# Patient Record
Sex: MALE | Race: WHITE | NOT HISPANIC OR LATINO
[De-identification: names, ages, dates, MRNs, and addresses within clinical notes are randomized per-mention and may not be internally consistent; named-entity substitution may affect disease eponyms.]

---

## 2017-02-21 ENCOUNTER — RX RENEWAL (OUTPATIENT)
Age: 82
End: 2017-02-21

## 2017-03-01 ENCOUNTER — APPOINTMENT (OUTPATIENT)
Dept: ENDOCRINOLOGY | Facility: CLINIC | Age: 82
End: 2017-03-01

## 2017-03-01 ENCOUNTER — LABORATORY RESULT (OUTPATIENT)
Age: 82
End: 2017-03-01

## 2017-03-01 VITALS
HEART RATE: 63 BPM | BODY MASS INDEX: 26.52 KG/M2 | WEIGHT: 175 LBS | HEIGHT: 68.25 IN | DIASTOLIC BLOOD PRESSURE: 70 MMHG | SYSTOLIC BLOOD PRESSURE: 120 MMHG | OXYGEN SATURATION: 97 %

## 2017-03-02 LAB
ALBUMIN SERPL ELPH-MCNC: 4.4 G/DL
ALP BLD-CCNC: 56 U/L
ALT SERPL-CCNC: 11 U/L
ANION GAP SERPL CALC-SCNC: 13 MMOL/L
AST SERPL-CCNC: 18 U/L
BASOPHILS # BLD AUTO: 0.02 K/UL
BASOPHILS NFR BLD AUTO: 0.4 %
BILIRUB SERPL-MCNC: 0.5 MG/DL
BUN SERPL-MCNC: 20 MG/DL
CALCIUM SERPL-MCNC: 9.5 MG/DL
CHLORIDE SERPL-SCNC: 100 MMOL/L
CO2 SERPL-SCNC: 26 MMOL/L
CREAT SERPL-MCNC: 1.3 MG/DL
EOSINOPHIL # BLD AUTO: 0.34 K/UL
EOSINOPHIL NFR BLD AUTO: 6.4 %
FERRITIN SERPL-MCNC: 27.9 NG/ML
FT4I SERPL CALC-MCNC: 9.2 INDEX
GLUCOSE SERPL-MCNC: 90 MG/DL
HCT VFR BLD CALC: 44.7 %
HGB BLD-MCNC: 14.8 G/DL
IMM GRANULOCYTES NFR BLD AUTO: 0 %
IRON SATN MFR SERPL: 32 %
IRON SERPL-MCNC: 113 UG/DL
LYMPHOCYTES # BLD AUTO: 1.59 K/UL
LYMPHOCYTES NFR BLD AUTO: 29.8 %
MAN DIFF?: NORMAL
MCHC RBC-ENTMCNC: 30.6 PG
MCHC RBC-ENTMCNC: 33.1 GM/DL
MCV RBC AUTO: 92.4 FL
MONOCYTES # BLD AUTO: 0.49 K/UL
MONOCYTES NFR BLD AUTO: 9.2 %
NEUTROPHILS # BLD AUTO: 2.9 K/UL
NEUTROPHILS NFR BLD AUTO: 54.2 %
PLATELET # BLD AUTO: 173 K/UL
POTASSIUM SERPL-SCNC: 5.2 MMOL/L
PROT SERPL-MCNC: 7.3 G/DL
RBC # BLD: 4.84 M/UL
RBC # FLD: 13.8 %
SODIUM SERPL-SCNC: 139 MMOL/L
T3 SERPL-MCNC: 99 NG/DL
T3RU NFR SERPL: 0.94 INDEX
T4 FREE SERPL-MCNC: 1.4 NG/DL
T4 SERPL-MCNC: 8.7 UG/DL
THYROGLOB AB SERPL-ACNC: <20 IU/ML
THYROGLOB SERPL-MCNC: <0.2 NG/ML
TIBC SERPL-MCNC: 352 UG/DL
TSH SERPL-ACNC: 1.65 UIU/ML
UIBC SERPL-MCNC: 239 UG/DL
WBC # FLD AUTO: 5.34 K/UL

## 2018-02-16 ENCOUNTER — RX RENEWAL (OUTPATIENT)
Age: 83
End: 2018-02-16

## 2018-03-08 ENCOUNTER — LABORATORY RESULT (OUTPATIENT)
Age: 83
End: 2018-03-08

## 2018-03-08 ENCOUNTER — APPOINTMENT (OUTPATIENT)
Dept: ENDOCRINOLOGY | Facility: CLINIC | Age: 83
End: 2018-03-08
Payer: MEDICARE

## 2018-03-08 VITALS
WEIGHT: 174 LBS | HEIGHT: 68.25 IN | SYSTOLIC BLOOD PRESSURE: 120 MMHG | OXYGEN SATURATION: 98 % | HEART RATE: 76 BPM | DIASTOLIC BLOOD PRESSURE: 70 MMHG | BODY MASS INDEX: 26.37 KG/M2

## 2018-03-08 PROCEDURE — 99214 OFFICE O/P EST MOD 30 MIN: CPT

## 2018-03-08 RX ORDER — BACITRACIN 500 [USP'U]/G
500 OINTMENT OPHTHALMIC
Qty: 4 | Refills: 0 | Status: COMPLETED | COMMUNITY
Start: 2016-11-07

## 2018-03-09 LAB
ALBUMIN SERPL ELPH-MCNC: 4.3 G/DL
ALP BLD-CCNC: 56 U/L
ALT SERPL-CCNC: 14 U/L
ANION GAP SERPL CALC-SCNC: 11 MMOL/L
AST SERPL-CCNC: 21 U/L
BILIRUB SERPL-MCNC: 0.5 MG/DL
BUN SERPL-MCNC: 20 MG/DL
CALCIUM SERPL-MCNC: 9.8 MG/DL
CHLORIDE SERPL-SCNC: 103 MMOL/L
CO2 SERPL-SCNC: 29 MMOL/L
CREAT SERPL-MCNC: 1.26 MG/DL
ESTRADIOL SERPL-MCNC: 20 PG/ML
FSH SERPL-MCNC: 10.5 IU/L
GLUCOSE SERPL-MCNC: 66 MG/DL
LH SERPL-ACNC: 8.5 IU/L
POTASSIUM SERPL-SCNC: 5.1 MMOL/L
PROLACTIN SERPL-MCNC: 17 NG/ML
PROT SERPL-MCNC: 7.2 G/DL
SODIUM SERPL-SCNC: 143 MMOL/L
T3RU NFR SERPL: 0.91 INDEX
T4 SERPL-MCNC: 9.3 UG/DL
TESTOST SERPL-MCNC: 516.1 NG/DL
THYROGLOB AB SERPL-ACNC: <20 IU/ML
THYROGLOB SERPL-MCNC: <0.2 NG/ML
TSH SERPL-ACNC: 1.51 UIU/ML

## 2018-03-12 LAB — SHBG SERPL-SCNC: 53 NMOL/L

## 2019-01-28 ENCOUNTER — TRANSCRIPTION ENCOUNTER (OUTPATIENT)
Age: 84
End: 2019-01-28

## 2019-01-29 ENCOUNTER — OUTPATIENT (OUTPATIENT)
Dept: OUTPATIENT SERVICES | Facility: HOSPITAL | Age: 84
LOS: 1 days | End: 2019-01-29
Payer: MEDICARE

## 2019-01-29 VITALS
HEART RATE: 56 BPM | WEIGHT: 173.72 LBS | TEMPERATURE: 97 F | OXYGEN SATURATION: 99 % | DIASTOLIC BLOOD PRESSURE: 83 MMHG | HEIGHT: 68.5 IN | RESPIRATION RATE: 18 BRPM | SYSTOLIC BLOOD PRESSURE: 153 MMHG

## 2019-01-29 VITALS
SYSTOLIC BLOOD PRESSURE: 134 MMHG | RESPIRATION RATE: 18 BRPM | HEART RATE: 65 BPM | DIASTOLIC BLOOD PRESSURE: 77 MMHG | OXYGEN SATURATION: 100 %

## 2019-01-29 DIAGNOSIS — H25.811 COMBINED FORMS OF AGE-RELATED CATARACT, RIGHT EYE: ICD-10-CM

## 2019-01-29 DIAGNOSIS — Z98.890 OTHER SPECIFIED POSTPROCEDURAL STATES: Chronic | ICD-10-CM

## 2019-01-29 DIAGNOSIS — Z90.89 ACQUIRED ABSENCE OF OTHER ORGANS: Chronic | ICD-10-CM

## 2019-01-29 PROCEDURE — V2632: CPT

## 2019-01-29 PROCEDURE — 66984 XCAPSL CTRC RMVL W/O ECP: CPT | Mod: RT

## 2019-02-04 PROBLEM — N40.0 BENIGN PROSTATIC HYPERPLASIA WITHOUT LOWER URINARY TRACT SYMPTOMS: Chronic | Status: ACTIVE | Noted: 2019-01-29

## 2019-02-04 PROBLEM — C73 MALIGNANT NEOPLASM OF THYROID GLAND: Chronic | Status: ACTIVE | Noted: 2019-01-29

## 2019-02-06 ENCOUNTER — RX RENEWAL (OUTPATIENT)
Age: 84
End: 2019-02-06

## 2019-03-05 ENCOUNTER — APPOINTMENT (OUTPATIENT)
Dept: ENDOCRINOLOGY | Facility: CLINIC | Age: 84
End: 2019-03-05
Payer: MEDICARE

## 2019-03-05 ENCOUNTER — LABORATORY RESULT (OUTPATIENT)
Age: 84
End: 2019-03-05

## 2019-03-05 ENCOUNTER — TRANSCRIPTION ENCOUNTER (OUTPATIENT)
Age: 84
End: 2019-03-05

## 2019-03-05 VITALS
DIASTOLIC BLOOD PRESSURE: 90 MMHG | BODY MASS INDEX: 25.91 KG/M2 | SYSTOLIC BLOOD PRESSURE: 124 MMHG | HEART RATE: 54 BPM | WEIGHT: 171 LBS | OXYGEN SATURATION: 97 % | HEIGHT: 68 IN

## 2019-03-05 DIAGNOSIS — N62 HYPERTROPHY OF BREAST: ICD-10-CM

## 2019-03-05 PROCEDURE — 77080 DXA BONE DENSITY AXIAL: CPT | Mod: GA

## 2019-03-05 PROCEDURE — ZZZZZ: CPT

## 2019-03-05 PROCEDURE — 99214 OFFICE O/P EST MOD 30 MIN: CPT | Mod: 25

## 2019-03-06 LAB
ALBUMIN SERPL ELPH-MCNC: 4.4 G/DL
ALP BLD-CCNC: 61 U/L
ALT SERPL-CCNC: 14 U/L
ANION GAP SERPL CALC-SCNC: 13 MMOL/L
AST SERPL-CCNC: 21 U/L
BILIRUB SERPL-MCNC: 0.4 MG/DL
BUN SERPL-MCNC: 20 MG/DL
CALCIUM SERPL-MCNC: 10 MG/DL
CHLORIDE SERPL-SCNC: 101 MMOL/L
CO2 SERPL-SCNC: 26 MMOL/L
CREAT SERPL-MCNC: 1.25 MG/DL
GLUCOSE SERPL-MCNC: 90 MG/DL
POTASSIUM SERPL-SCNC: 4.5 MMOL/L
PROT SERPL-MCNC: 7.6 G/DL
SODIUM SERPL-SCNC: 140 MMOL/L
T3RU NFR SERPL: 0.9 TBI
T4 SERPL-MCNC: 8.9 UG/DL
THYROGLOB AB SERPL-ACNC: <20 IU/ML
THYROGLOB SERPL-MCNC: <0.2 NG/ML
TSH SERPL-ACNC: 1.48 UIU/ML

## 2019-03-06 NOTE — END OF VISIT
[FreeTextEntry3] : I, Rhona Borja, authored this note working as a medical scribe for Dr. Valle.  03/05/2019. 11:45AM. \par This note was authored by Rhona Borja working as medical scribe for me. I have reviewed, edited, and revised the note as needed. I am in agreement with the exam findings, imaging findings, and treatment plan.  Jose Valle MD

## 2019-03-06 NOTE — PROCEDURE
[FreeTextEntry1] : Bone mineral density: 03/05/2019 \par Indication: vs. 2008\par Spine: (L1-L2) -1.2 osteopenia, no significant change \par Total hip: -0.7 normal (-8.4%)\par Femoral neck: -1.3 osteopenia (-9.0%)\par Proximal radius: -2.2 osteopenia, no prior

## 2019-03-06 NOTE — ASSESSMENT
[FreeTextEntry1] : 81 yo male with \par \par 1. H/o papillary thyroid CA: 2005, WANDA, clinically and chemically euthyroid on LT4 100. Pt states he gets colder during the afternoon. No local neck pain or dysphagia. Maintain low-normal TSH.\par \par 2. Mild gynecomastia: although no true glandular tissue. Decreased sexual function. Pt advised that this is minor and that testo replacement has risks and benefits. No rx. \par \par 3. Osteopenia: BMD 3/2019 vs 2008 sl decrease in hip though still consistent with osteopenia and not high risk for future fx based on this value. This is not a clear indication for men as it was determined from a population of postmenopausal women. His rate of change is not rapid; no indication for additional osteoporosis rx. \par \par I request labs sent out to include TG. f/u in 1 year.

## 2019-03-06 NOTE — REVIEW OF SYSTEMS
[Joint Pain] : joint pain [Joint Stiffness] : joint stiffness [Back Pain] : back pain [Negative] : Heme/Lymph [Erectile Dysfunction] : erectile dysfunction [Fatigue] : no fatigue [Dysphagia] : no dysphagia [Dysphonia] : no dysphonia [Neck Pain] : no neck pain [Chest Pain] : no chest pain [Palpitations] : no palpitations [Leg Claudication] : no intermittent leg claudication [Lower Ext Edema] : no lower extremity edema [Shortness Of Breath] : no shortness of breath [Wheezing] : no wheezing was heard [Cough] : no cough [SOB on Exertion] : no shortness of breath during exertion [Orthopnea] : no orthopnea [Nausea] : no nausea [Vomiting] : no vomiting was observed [Constipation] : no constipation [Diarrhea] : no diarrhea [Polyuria] : no polyuria [Dysuria] : no dysuria [Muscle Weakness] : no muscle weakness [Muscle Cramps] : no muscle cramps [Myalgia] : no myalgia  [Acanthosis] : no acanthosis  [Hirsutism] : no hirsutism [Acne] : no acne [Hair Loss] : no hair loss [Dry Skin] : no dry skin [Headache] : no headaches [Tremors] : no tremors [Dizziness] : no dizziness [Pain/Numbness of Digits] : no pain/numbness of digits [Depression] : no depression [Anxiety] : no anxiety [Insomnia] : no insomnia [Stress] : no stress [Easy Bleeding] : no ~M tendency for easy bleeding [Swelling] : no swelling [Lymphadenopathy] : no lymphadenopathy

## 2019-03-06 NOTE — HISTORY OF PRESENT ILLNESS
[FreeTextEntry1] : one year f/u 84 y/o male with h/o papillary thyroid CA in 2005; multicentric follicular variant.\par \par On LT4 100 qd. No sx of  hyper on hypothyroidism. No local neck pain. No dysphagia. Mild GERD. Wt has been stable. Chemically euthyroid. \par \par Mild gynecomastia, no mastodynia. Slow chronic decrease in erectile function.\par \par C/o R-hip pain at the femoral head. Worse upon awakening and eases with mobility. c/o back discomfort and standing is worse and relieved with sitting. No radiation of pain. Does core strength exercising (planks and push-ups) and has improved his pain. Drinks skim milk every day. No fhx osteoporosis. Decrease in height due to disc disease. Sl hunching in morning, lower back pain. \par \par Pt had cataract surgery in 2019 with improved vision but starburst issue still present.

## 2019-03-06 NOTE — PHYSICAL EXAM
[Alert] : alert [No Acute Distress] : no acute distress [Well Nourished] : well nourished [Well Developed] : well developed [Normal Sclera/Conjunctiva] : normal sclera/conjunctiva [No Proptosis] : no proptosis [Normal Oropharynx] : the oropharynx was normal [No Neck Mass] : no neck mass was observed [Supple] : the neck was supple [Well Healed Scar] : well healed scar [No Respiratory Distress] : no respiratory distress [No Accessory Muscle Use] : no accessory muscle use [Clear to Auscultation] : lungs were clear to auscultation bilaterally [Normal Rate] : heart rate was normal  [Normal S1, S2] : normal S1 and S2 [Regular Rhythm] : with a regular rhythm [Normal Bowel Sounds] : normal bowel sounds [Not Tender] : non-tender [Soft] : abdomen soft [Not Distended] : not distended [Post Cervical Nodes] : posterior cervical nodes [Anterior Cervical Nodes] : anterior cervical nodes [Axillary Nodes] : axillary nodes [Normal] : normal and non tender [No Spinal Tenderness] : no spinal tenderness [No Stigmata of Cushings Syndrome] : no stigmata of cushings syndrome [Normal Gait] : normal gait [Normal Strength/Tone] : muscle strength and tone were normal [No Rash] : no rash [Normal Reflexes] : deep tendon reflexes were 2+ and symmetric [No Tremors] : no tremors [Oriented x3] : oriented to person, place, and time [Gynecomastia] : no gynecomastia [de-identified] : minimal scoliosis

## 2019-10-28 ENCOUNTER — RX RENEWAL (OUTPATIENT)
Age: 84
End: 2019-10-28

## 2020-05-28 ENCOUNTER — TRANSCRIPTION ENCOUNTER (OUTPATIENT)
Age: 85
End: 2020-05-28

## 2020-06-12 ENCOUNTER — APPOINTMENT (OUTPATIENT)
Dept: ENDOCRINOLOGY | Facility: CLINIC | Age: 85
End: 2020-06-12
Payer: MEDICARE

## 2020-06-12 VITALS
OXYGEN SATURATION: 96 % | SYSTOLIC BLOOD PRESSURE: 124 MMHG | HEART RATE: 66 BPM | BODY MASS INDEX: 25.76 KG/M2 | WEIGHT: 170 LBS | HEIGHT: 68 IN | DIASTOLIC BLOOD PRESSURE: 78 MMHG

## 2020-06-12 PROCEDURE — 99214 OFFICE O/P EST MOD 30 MIN: CPT

## 2020-06-12 RX ORDER — VIT A/VIT C/VIT E/ZINC/COPPER 4296-226
CAPSULE ORAL
Refills: 0 | Status: ACTIVE | COMMUNITY

## 2020-06-12 NOTE — HISTORY OF PRESENT ILLNESS
[FreeTextEntry1] :   f/u 10  h/o papillary thyroid CA in 2005; multicentric follicular variant.\par \par On LT4 100 qd. No sx of  hyper on hypothyroidism. No local neck pain. No dysphagia. Mild GERD. Wt has been stable. Chemically euthyroid. \par Occ low back in am. responds to stretching occ NSAID\par \par Pt had cataract surgery in 2019 with improved vision but starburst issue still present.

## 2020-06-12 NOTE — ASSESSMENT
[FreeTextEntry1] : 85 yo male with \par \par 1. H/o papillary thyroid CA: 2005, WANDA, clinically and chemically euthyroid on LT4 100 for hypothyroidism . Pt states he gets colder during the afternoon. No local neck pain or dysphagia. Maintain low-normal TSH.\par \par 2. . Osteopenia: BMD 3/2019 vs 2008 sl decrease in hip though still consistent with osteopenia and not high risk for future fx based on this value. This is not a clear indication for men as it was determined from a population of postmenopausal women. His rate of change is not rapid; no indication for additional osteoporosis rx. \par \par I request labs from Dr Alaniz  to include TG. f/u in 1 year.

## 2020-06-12 NOTE — PHYSICAL EXAM
[Alert] : alert [Well Nourished] : well nourished [Well Developed] : well developed [No Acute Distress] : no acute distress [Normal Sclera/Conjunctiva] : normal sclera/conjunctiva [No Proptosis] : no proptosis [EOMI] : extra ocular movement intact [No Thyroid Nodules] : no palpable thyroid nodules [Thyroid Not Enlarged] : the thyroid was not enlarged [No Respiratory Distress] : no respiratory distress [No Accessory Muscle Use] : no accessory muscle use [Well Healed Scar] : well healed scar [Clear to Auscultation] : lungs were clear to auscultation bilaterally [Normal S1, S2] : normal S1 and S2 [No Edema] : no peripheral edema [Normal Rate] : heart rate was normal [Regular Rhythm] : with a regular rhythm [Not Tender] : non-tender [Normal Bowel Sounds] : normal bowel sounds [Soft] : abdomen soft [Not Distended] : not distended [Normal Anterior Cervical Nodes] : no anterior cervical lymphadenopathy [Normal Posterior Cervical Nodes] : no posterior cervical lymphadenopathy [No Stigmata of Cushings Syndrome] : no stigmata of Cushings Syndrome [Normal Strength/Tone] : muscle strength and tone were normal [No Rash] : no rash [Normal Gait] : normal gait [Acanthosis Nigricans] : no acanthosis nigricans [No Tremors] : no tremors [Normal Reflexes] : deep tendon reflexes were 2+ and symmetric [Oriented x3] : oriented to person, place, and time

## 2020-06-12 NOTE — REASON FOR VISIT
[Follow - Up] : a follow-up visit [Thyroid Biopsy] : a thyroid biopsy [Hypothyroidism] : hypothyroidism [Spouse] : spouse

## 2020-06-17 ENCOUNTER — TRANSCRIPTION ENCOUNTER (OUTPATIENT)
Age: 85
End: 2020-06-17

## 2020-06-18 ENCOUNTER — TRANSCRIPTION ENCOUNTER (OUTPATIENT)
Age: 85
End: 2020-06-18

## 2020-07-20 ENCOUNTER — RX RENEWAL (OUTPATIENT)
Age: 85
End: 2020-07-20

## 2020-11-10 ENCOUNTER — NON-APPOINTMENT (OUTPATIENT)
Age: 85
End: 2020-11-10

## 2020-11-10 ENCOUNTER — APPOINTMENT (OUTPATIENT)
Dept: OPHTHALMOLOGY | Facility: CLINIC | Age: 85
End: 2020-11-10
Payer: MEDICARE

## 2020-11-10 PROCEDURE — 92014 COMPRE OPH EXAM EST PT 1/>: CPT

## 2020-11-10 PROCEDURE — 92202 OPSCPY EXTND ON/MAC DRAW: CPT

## 2020-11-10 PROCEDURE — 92134 CPTRZ OPH DX IMG PST SGM RTA: CPT

## 2020-12-08 ENCOUNTER — NON-APPOINTMENT (OUTPATIENT)
Age: 85
End: 2020-12-08

## 2020-12-08 ENCOUNTER — APPOINTMENT (OUTPATIENT)
Dept: OPHTHALMOLOGY | Facility: CLINIC | Age: 85
End: 2020-12-08
Payer: MEDICARE

## 2020-12-08 PROCEDURE — 99212 OFFICE O/P EST SF 10 MIN: CPT | Mod: 95

## 2021-06-30 ENCOUNTER — APPOINTMENT (OUTPATIENT)
Dept: ENDOCRINOLOGY | Facility: CLINIC | Age: 86
End: 2021-06-30
Payer: MEDICARE

## 2021-06-30 VITALS
DIASTOLIC BLOOD PRESSURE: 76 MMHG | SYSTOLIC BLOOD PRESSURE: 120 MMHG | BODY MASS INDEX: 25.39 KG/M2 | TEMPERATURE: 98 F | WEIGHT: 167 LBS | OXYGEN SATURATION: 96 % | HEART RATE: 57 BPM

## 2021-06-30 PROCEDURE — 99214 OFFICE O/P EST MOD 30 MIN: CPT

## 2021-06-30 NOTE — REASON FOR VISIT
[Follow - Up] : a follow-up visit [Hypothyroidism] : hypothyroidism [Spouse] : spouse [Thyroid Cancer] : thyroid cancer

## 2021-06-30 NOTE — HISTORY OF PRESENT ILLNESS
[FreeTextEntry1] :   f/u   h/o papillary thyroid CA in 2005; multicentric follicular variant.\par \par On LT4 100 qd. No sx of  hyper on hypothyroidism. No local neck pain. No dysphagia. Mild GERD. Wt has been stable. Chemically euthyroid. \par Occ low back in am. responds to stretching occ NSAID\par \par Pt had cataract surgery in 2019 with improved vision but starburst issue still present. \par Had release trigger finger 3rd Right

## 2021-06-30 NOTE — ASSESSMENT
[FreeTextEntry1] : 5   yo male with \par \par 1. H/o papillary thyroid CA: 2005, WANDA, clinically and chemically euthyroid on LT4 100 for hypothyroidism . Pt states he gets colder during the afternoon. No local neck pain or dysphagia. Maintain low-normal TSH.\par \par 2. . Osteopenia: BMD 3/2019 vs 2008 sl decrease in hip though still consistent with osteopenia and not high risk for future fx based on this value. This is not a clear indication for men as it was determined from a population of postmenopausal women. His rate of change is not rapid; no indication for additional osteoporosis rx. \par \par check TG. next labs\par repeat BMD next visit

## 2021-06-30 NOTE — PHYSICAL EXAM
[Alert] : alert [Well Nourished] : well nourished [No Acute Distress] : no acute distress [Well Developed] : well developed [Normal Sclera/Conjunctiva] : normal sclera/conjunctiva [EOMI] : extra ocular movement intact [No Proptosis] : no proptosis [Thyroid Not Enlarged] : the thyroid was not enlarged [No Thyroid Nodules] : no palpable thyroid nodules [Well Healed Scar] : well healed scar [No Respiratory Distress] : no respiratory distress [No Accessory Muscle Use] : no accessory muscle use [Clear to Auscultation] : lungs were clear to auscultation bilaterally [Normal S1, S2] : normal S1 and S2 [Normal Rate] : heart rate was normal [Regular Rhythm] : with a regular rhythm [No Edema] : no peripheral edema [Normal Bowel Sounds] : normal bowel sounds [Not Tender] : non-tender [Not Distended] : not distended [Soft] : abdomen soft [Normal Anterior Cervical Nodes] : no anterior cervical lymphadenopathy [No Stigmata of Cushings Syndrome] : no stigmata of Cushings Syndrome [Normal Gait] : normal gait [Normal Strength/Tone] : muscle strength and tone were normal [No Rash] : no rash [Normal Reflexes] : deep tendon reflexes were 2+ and symmetric [No Tremors] : no tremors [Oriented x3] : oriented to person, place, and time [Acanthosis Nigricans] : no acanthosis nigricans

## 2021-10-19 ENCOUNTER — NON-APPOINTMENT (OUTPATIENT)
Age: 86
End: 2021-10-19

## 2021-11-11 ENCOUNTER — APPOINTMENT (OUTPATIENT)
Dept: OPHTHALMOLOGY | Facility: CLINIC | Age: 86
End: 2021-11-11
Payer: MEDICARE

## 2021-11-11 ENCOUNTER — NON-APPOINTMENT (OUTPATIENT)
Age: 86
End: 2021-11-11

## 2021-11-11 PROCEDURE — 92014 COMPRE OPH EXAM EST PT 1/>: CPT

## 2021-11-11 PROCEDURE — 92202 OPSCPY EXTND ON/MAC DRAW: CPT

## 2021-11-11 PROCEDURE — 92134 CPTRZ OPH DX IMG PST SGM RTA: CPT

## 2021-12-09 ENCOUNTER — APPOINTMENT (OUTPATIENT)
Dept: OPHTHALMOLOGY | Facility: CLINIC | Age: 86
End: 2021-12-09
Payer: MEDICARE

## 2021-12-09 ENCOUNTER — NON-APPOINTMENT (OUTPATIENT)
Age: 86
End: 2021-12-09

## 2021-12-09 PROCEDURE — 66821 AFTER CATARACT LASER SURGERY: CPT | Mod: RT

## 2021-12-09 PROCEDURE — 92136 OPHTHALMIC BIOMETRY: CPT

## 2021-12-30 ENCOUNTER — APPOINTMENT (OUTPATIENT)
Dept: OPHTHALMOLOGY | Facility: CLINIC | Age: 86
End: 2021-12-30
Payer: MEDICARE

## 2021-12-30 ENCOUNTER — NON-APPOINTMENT (OUTPATIENT)
Age: 86
End: 2021-12-30

## 2021-12-30 PROCEDURE — 99024 POSTOP FOLLOW-UP VISIT: CPT

## 2022-01-14 ENCOUNTER — APPOINTMENT (OUTPATIENT)
Dept: ENDOCRINOLOGY | Facility: CLINIC | Age: 87
End: 2022-01-14
Payer: MEDICARE

## 2022-01-14 ENCOUNTER — LABORATORY RESULT (OUTPATIENT)
Age: 87
End: 2022-01-14

## 2022-01-14 VITALS
SYSTOLIC BLOOD PRESSURE: 124 MMHG | BODY MASS INDEX: 26.52 KG/M2 | WEIGHT: 173 LBS | HEIGHT: 67.9 IN | TEMPERATURE: 97 F | HEART RATE: 72 BPM | OXYGEN SATURATION: 98 % | DIASTOLIC BLOOD PRESSURE: 82 MMHG

## 2022-01-14 PROCEDURE — 77080 DXA BONE DENSITY AXIAL: CPT | Mod: GA

## 2022-01-14 PROCEDURE — ZZZZZ: CPT

## 2022-01-14 PROCEDURE — 99214 OFFICE O/P EST MOD 30 MIN: CPT | Mod: 25

## 2022-01-14 RX ORDER — MULTIVITAMIN
TABLET ORAL
Refills: 0 | Status: ACTIVE | COMMUNITY

## 2022-01-14 NOTE — END OF VISIT
[FreeTextEntry3] : I, Mohamud Rucker, authored this note working as a medical scribe for Dr. Valle.  01/14/2022.  1:45PM. This note was authored by the medical scribe for me. I have reviewed, edited, and revised the note as needed. I am in agreement with the exam findings, imaging findings, and treatment plan.  Jose Valle MD

## 2022-01-14 NOTE — ASSESSMENT
[Levothyroxine] : The patient was instructed to take Levothyroxine on an empty stomach, separate from vitamins, and wait at least 30 minutes before eating [FreeTextEntry1] : 87 yo male with \par \par 1. H/o papillary thyroid CA: 2005, WANDA, clinically and chemically euthyroid on LT4 100 mcg daily for hypothyroidism. No local neck pain. No dysphagia or dysphonia. No raciness, shakiness, heat/cold intolerance, tiredness, or fatigue. No palpitations, tremors, or sudden weight gain/loss. Maintain low-normal TSH.\par \par 2. Osteopenia: BMD 3/2019 vs 2008 sl decrease in hip though still consistent with osteopenia and not high risk for future fx based on this value. This is not a clear indication for men as it was determined from a population of postmenopausal women. BMD 1/2022 indicates stable osteopenia, worsened normal total hip, worsened osteopenia in femoral neck, and stable osteoporosis in proximal radius. BMD results reviewed w/ pt. Pt risk of future fx is still low. No indication for additional osteoporosis rx at this time.\par \par Labs reviewed: Vitamin D 26.6, low.\par \par Request labs sent out. Will include repeat Vitamin D, TFT, and TG.\par \par F/u 6 months

## 2022-01-14 NOTE — PHYSICAL EXAM
[Alert] : alert [Well Nourished] : well nourished [No Acute Distress] : no acute distress [Well Developed] : well developed [Normal Sclera/Conjunctiva] : normal sclera/conjunctiva [EOMI] : extra ocular movement intact [No Proptosis] : no proptosis [Thyroid Not Enlarged] : the thyroid was not enlarged [No Thyroid Nodules] : no palpable thyroid nodules [Well Healed Scar] : well healed scar [Clear to Auscultation] : lungs were clear to auscultation bilaterally [Normal S1, S2] : normal S1 and S2 [Normal Rate] : heart rate was normal [Regular Rhythm] : with a regular rhythm [No Edema] : no peripheral edema [Normal Bowel Sounds] : normal bowel sounds [Not Tender] : non-tender [Not Distended] : not distended [Soft] : abdomen soft [Normal Anterior Cervical Nodes] : no anterior cervical lymphadenopathy [No Stigmata of Cushings Syndrome] : no stigmata of Cushings Syndrome [Normal Gait] : normal gait [Normal Reflexes] : deep tendon reflexes were 2+ and symmetric [No Tremors] : no tremors [Oriented x3] : oriented to person, place, and time [de-identified] : thyroidectomy scar

## 2022-01-14 NOTE — REASON FOR VISIT
[Follow - Up] : a follow-up visit [Hypothyroidism] : hypothyroidism [Thyroid Cancer] : thyroid cancer [Spouse] : spouse

## 2022-01-14 NOTE — PROCEDURE
[FreeTextEntry1] : Bone mineral density: 01/14/2022 \par Indication: vs. 2019\par Spine: (L1-L2) -1.3 osteopenia, no significant change\par Total hip: -0.9 normal, -3.3%\par Femoral neck: -1.6 osteopenia, -4.3%\par Proximal radius: -2.5 osteoporosis, no significant change\par \par Bone mineral density: 03/05/2019 \par Indication: vs. 2008\par Spine: (L1-L2) -1.2 osteopenia, no significant change \par Total hip: -0.7 normal (-8.4%)\par Femoral neck: -1.3 osteopenia (-9.0%)\par Proximal radius: -2.2 osteopenia, no prior

## 2022-01-14 NOTE — HISTORY OF PRESENT ILLNESS
[FreeTextEntry1] : No significant interval health changes. No  hospitalizations, fractures, or change in medications.\par \par H/o papillary thyroid CA in 2005; multicentric follicular variant. Chemically euthyroid on LT4 100 mcg daily. No sx of hyper on hypothyroidism. No local neck pain. No dysphagia or dysphonia. No raciness, shakiness, heat/cold intolerance, tiredness, or fatigue. No palpitations, tremors, or sudden weight gain/loss. \par \par Mild GERD. Wt has been stable. Pt takes multivitamin daily.\par \par Pt had cataract surgery in 2019 with improved vision but starburst issue still present. \par \par H/o trigger 3rd right finger release summer 2021.

## 2022-01-17 LAB
25(OH)D3 SERPL-MCNC: 30.5 NG/ML
ALBUMIN SERPL ELPH-MCNC: 4.4 G/DL
ALP BLD-CCNC: 67 U/L
ALT SERPL-CCNC: 10 U/L
ANION GAP SERPL CALC-SCNC: 11 MMOL/L
AST SERPL-CCNC: 19 U/L
BILIRUB SERPL-MCNC: 0.3 MG/DL
BUN SERPL-MCNC: 24 MG/DL
CALCIUM SERPL-MCNC: 9.6 MG/DL
CALCIUM SERPL-MCNC: 9.6 MG/DL
CHLORIDE SERPL-SCNC: 105 MMOL/L
CO2 SERPL-SCNC: 26 MMOL/L
CREAT SERPL-MCNC: 1.21 MG/DL
GLUCOSE SERPL-MCNC: 92 MG/DL
PARATHYROID HORMONE INTACT: 57 PG/ML
POTASSIUM SERPL-SCNC: 4.7 MMOL/L
PROT SERPL-MCNC: 6.8 G/DL
SODIUM SERPL-SCNC: 142 MMOL/L
T3RU NFR SERPL: 1 TBI
T4 SERPL-MCNC: 7.4 UG/DL
THYROGLOB AB SERPL-ACNC: <20 IU/ML
THYROGLOB SERPL-MCNC: <0.2 NG/ML
TSH SERPL-ACNC: 2.83 UIU/ML

## 2022-02-03 ENCOUNTER — APPOINTMENT (OUTPATIENT)
Dept: OPHTHALMOLOGY | Facility: CLINIC | Age: 87
End: 2022-02-03
Payer: MEDICARE

## 2022-02-03 ENCOUNTER — NON-APPOINTMENT (OUTPATIENT)
Age: 87
End: 2022-02-03

## 2022-02-03 PROCEDURE — 92134 CPTRZ OPH DX IMG PST SGM RTA: CPT

## 2022-02-03 PROCEDURE — 92025 CPTRIZED CORNEAL TOPOGRAPHY: CPT

## 2022-02-03 PROCEDURE — 92014 COMPRE OPH EXAM EST PT 1/>: CPT | Mod: 24

## 2022-06-24 ENCOUNTER — NON-APPOINTMENT (OUTPATIENT)
Age: 87
End: 2022-06-24

## 2022-06-28 ENCOUNTER — NON-APPOINTMENT (OUTPATIENT)
Age: 87
End: 2022-06-28

## 2022-06-28 ENCOUNTER — APPOINTMENT (OUTPATIENT)
Dept: CARDIOLOGY | Facility: CLINIC | Age: 87
End: 2022-06-28

## 2022-06-28 VITALS
SYSTOLIC BLOOD PRESSURE: 146 MMHG | HEIGHT: 67 IN | WEIGHT: 172 LBS | OXYGEN SATURATION: 98 % | DIASTOLIC BLOOD PRESSURE: 77 MMHG | BODY MASS INDEX: 27 KG/M2 | HEART RATE: 61 BPM

## 2022-06-28 DIAGNOSIS — R07.9 CHEST PAIN, UNSPECIFIED: ICD-10-CM

## 2022-06-28 PROCEDURE — 99204 OFFICE O/P NEW MOD 45 MIN: CPT

## 2022-06-28 PROCEDURE — 93000 ELECTROCARDIOGRAM COMPLETE: CPT

## 2022-07-03 PROBLEM — R07.9 CHEST PAIN: Status: ACTIVE | Noted: 2022-07-03

## 2022-07-04 ENCOUNTER — RX RENEWAL (OUTPATIENT)
Age: 87
End: 2022-07-04

## 2022-07-05 ENCOUNTER — APPOINTMENT (OUTPATIENT)
Dept: CARDIOLOGY | Facility: CLINIC | Age: 87
End: 2022-07-05

## 2022-07-05 PROCEDURE — 93306 TTE W/DOPPLER COMPLETE: CPT

## 2022-07-05 PROCEDURE — 93015 CV STRESS TEST SUPVJ I&R: CPT

## 2022-07-20 ENCOUNTER — APPOINTMENT (OUTPATIENT)
Dept: ENDOCRINOLOGY | Facility: CLINIC | Age: 87
End: 2022-07-20

## 2022-07-20 VITALS
BODY MASS INDEX: 26.37 KG/M2 | WEIGHT: 168 LBS | SYSTOLIC BLOOD PRESSURE: 136 MMHG | HEART RATE: 62 BPM | HEIGHT: 67 IN | TEMPERATURE: 97.7 F | OXYGEN SATURATION: 98 % | DIASTOLIC BLOOD PRESSURE: 72 MMHG

## 2022-07-20 DIAGNOSIS — M85.80 OTHER SPECIFIED DISORDERS OF BONE DENSITY AND STRUCTURE, UNSPECIFIED SITE: ICD-10-CM

## 2022-07-20 PROCEDURE — 99214 OFFICE O/P EST MOD 30 MIN: CPT

## 2022-07-20 RX ORDER — TADALAFIL 5 MG/1
5 TABLET ORAL
Qty: 90 | Refills: 0 | Status: ACTIVE | COMMUNITY
Start: 2022-07-12

## 2022-07-21 PROBLEM — M85.80 OSTEOPENIA: Status: ACTIVE | Noted: 2019-03-05

## 2022-07-21 NOTE — ASSESSMENT
[Levothyroxine] : The patient was instructed to take Levothyroxine on an empty stomach, separate from vitamins, and wait at least 30 minutes before eating [FreeTextEntry1] : 85 yo male with \par \par 1. Thyroid: H/o papillary thyroid CA: 2005, WANDA, clinically and chemically euthyroid on LT4 100 mcg daily for hypothyroidism. No local neck pain. No dysphagia or dysphonia. No raciness, shakiness, heat/cold intolerance, tiredness, or fatigue. No palpitations, tremors, or sudden weight gain/loss. Maintain low-normal TSH.\par \par 2. Osteopenia: BMD 3/2019 vs 2008 sl decrease in hip though still consistent with osteopenia and not high risk for future fx based on this value. This is not a clear indication for men as it was determined from a population of postmenopausal women. BMD 1/2022 indicates stable osteopenia, worsened normal total hip, worsened osteopenia in femoral neck, and stable osteoporosis in proximal radius. BMD results reviewed w/ pt. Pt risk of future fx is still low. No indication for additional osteoporosis rx at this time.\par \par Pt c/o some hip pain  due to arthritis.\par \par Call For Labs \par \par F/u 6 months w BMD

## 2022-07-21 NOTE — HISTORY OF PRESENT ILLNESS
[FreeTextEntry1] : Patient returns for a follow up visit for hypothyroidism, thyroid cancer and osteoporosis. Since the last visit pt has no significant interval health changes. No  hospitalizations, fractures, or change in medications.\par \par Thyroid ; \par H/o papillary thyroid CA in 2005; multicentric follicular variant. Chemically euthyroid on LT4 100 mcg daily. No sx of hyper on hypothyroidism. No local neck pain. No dysphagia or dysphonia. No raciness, shakiness, heat/cold intolerance, tiredness, or fatigue. No palpitations, tremors, or sudden weight gain/loss. \par \par Osteopenia: Pt has mildly low bone density in the proximal radius. I do not recommend treatment. Observe \par \par \par Mild GERD. Wt has been stable. Pt takes multivitamin daily.\par \par Pt had cataract surgery in 2019 with improved vision but starburst issue still present. \par \par Pt c/o some hip pain due to arthritis. \par

## 2022-07-21 NOTE — PHYSICAL EXAM
[Alert] : alert [Well Nourished] : well nourished [No Acute Distress] : no acute distress [Well Developed] : well developed [Normal Sclera/Conjunctiva] : normal sclera/conjunctiva [EOMI] : extra ocular movement intact [No Proptosis] : no proptosis [Thyroid Not Enlarged] : the thyroid was not enlarged [No Thyroid Nodules] : no palpable thyroid nodules [Well Healed Scar] : well healed scar [Clear to Auscultation] : lungs were clear to auscultation bilaterally [Normal S1, S2] : normal S1 and S2 [Normal Rate] : heart rate was normal [Regular Rhythm] : with a regular rhythm [No Edema] : no peripheral edema [Normal Bowel Sounds] : normal bowel sounds [Not Tender] : non-tender [Not Distended] : not distended [Soft] : abdomen soft [Normal Anterior Cervical Nodes] : no anterior cervical lymphadenopathy [No Stigmata of Cushings Syndrome] : no stigmata of Cushings Syndrome [Normal Gait] : normal gait [Normal Reflexes] : deep tendon reflexes were 2+ and symmetric [No Tremors] : no tremors [Oriented x3] : oriented to person, place, and time [de-identified] : thyroidectomy scar no thyroid nodes

## 2022-07-21 NOTE — END OF VISIT
[FreeTextEntry3] : This note was written by Kay Willett on ( July 20, 2022) acting as a medical scribe for Dr. Valle.  This note was authored by the medical scribe for me. I have reviewed, edited, and revised the note as needed. I am in agreement with the exam findings, imaging findings, and treatment plan.  Jose Valle MD

## 2022-11-22 ENCOUNTER — NON-APPOINTMENT (OUTPATIENT)
Age: 87
End: 2022-11-22

## 2022-11-22 ENCOUNTER — APPOINTMENT (OUTPATIENT)
Dept: OPHTHALMOLOGY | Facility: CLINIC | Age: 87
End: 2022-11-22

## 2022-11-22 PROCEDURE — 92014 COMPRE OPH EXAM EST PT 1/>: CPT

## 2023-01-13 ENCOUNTER — NON-APPOINTMENT (OUTPATIENT)
Age: 88
End: 2023-01-13

## 2023-01-17 ENCOUNTER — APPOINTMENT (OUTPATIENT)
Dept: OPHTHALMOLOGY | Facility: AMBULATORY SURGERY CENTER | Age: 88
End: 2023-01-17
Payer: MEDICARE

## 2023-01-17 PROCEDURE — 66982 XCAPSL CTRC RMVL CPLX WO ECP: CPT | Mod: LT

## 2023-01-18 ENCOUNTER — NON-APPOINTMENT (OUTPATIENT)
Age: 88
End: 2023-01-18

## 2023-01-18 ENCOUNTER — APPOINTMENT (OUTPATIENT)
Dept: OPHTHALMOLOGY | Facility: CLINIC | Age: 88
End: 2023-01-18
Payer: MEDICARE

## 2023-01-18 PROCEDURE — 99024 POSTOP FOLLOW-UP VISIT: CPT

## 2023-01-24 ENCOUNTER — NON-APPOINTMENT (OUTPATIENT)
Age: 88
End: 2023-01-24

## 2023-01-24 ENCOUNTER — APPOINTMENT (OUTPATIENT)
Dept: OPHTHALMOLOGY | Facility: CLINIC | Age: 88
End: 2023-01-24
Payer: MEDICARE

## 2023-01-24 PROCEDURE — 99024 POSTOP FOLLOW-UP VISIT: CPT

## 2023-01-31 ENCOUNTER — APPOINTMENT (OUTPATIENT)
Dept: ENDOCRINOLOGY | Facility: CLINIC | Age: 88
End: 2023-01-31
Payer: MEDICARE

## 2023-01-31 VITALS
WEIGHT: 165 LBS | RESPIRATION RATE: 16 BRPM | DIASTOLIC BLOOD PRESSURE: 82 MMHG | SYSTOLIC BLOOD PRESSURE: 132 MMHG | HEIGHT: 67 IN | BODY MASS INDEX: 25.9 KG/M2 | OXYGEN SATURATION: 99 % | HEART RATE: 56 BPM

## 2023-01-31 DIAGNOSIS — E03.9 HYPOTHYROIDISM, UNSPECIFIED: ICD-10-CM

## 2023-01-31 PROCEDURE — 77080 DXA BONE DENSITY AXIAL: CPT | Mod: GA

## 2023-01-31 PROCEDURE — ZZZZZ: CPT

## 2023-01-31 PROCEDURE — 99214 OFFICE O/P EST MOD 30 MIN: CPT | Mod: 25

## 2023-02-01 NOTE — PROCEDURE
[FreeTextEntry1] : Bone Density 1/31/2023\par Indication vs 2022 Prior test showed bone loss \par Spine L1-L2 -1.1 osteopenia normal , no significant change \par Total Hip -09 normal , no significant change \par Femoral Neck -1.4 osteopenia , no significant change \par Proximal Radius -3.0 osteoporosis -3.9% \par \par Bone mineral density: 01/14/2022 \par Indication: vs. 2019\par Spine: (L1-L2) -1.3 osteopenia, no significant change\par Total hip: -0.9 normal, -3.3%\par Femoral neck: -1.6 osteopenia, -4.3%\par Proximal radius: -2.5 osteoporosis, no significant change\par \par Bone mineral density: 03/05/2019 \par Indication: vs. 2008\par Spine: (L1-L2) -1.2 osteopenia, no significant change \par Total hip: -0.7 normal (-8.4%)\par Femoral neck: -1.3 osteopenia (-9.0%)\par Proximal radius: -2.2 osteopenia, no prior

## 2023-02-01 NOTE — PHYSICAL EXAM
[Alert] : alert [Well Nourished] : well nourished [No Acute Distress] : no acute distress [Well Developed] : well developed [Normal Sclera/Conjunctiva] : normal sclera/conjunctiva [EOMI] : extra ocular movement intact [No Proptosis] : no proptosis [Thyroid Not Enlarged] : the thyroid was not enlarged [No Thyroid Nodules] : no palpable thyroid nodules [Well Healed Scar] : well healed scar [Clear to Auscultation] : lungs were clear to auscultation bilaterally [Normal S1, S2] : normal S1 and S2 [Normal Rate] : heart rate was normal [Regular Rhythm] : with a regular rhythm [No Edema] : no peripheral edema [Normal Bowel Sounds] : normal bowel sounds [Not Tender] : non-tender [Not Distended] : not distended [Soft] : abdomen soft [Normal Anterior Cervical Nodes] : no anterior cervical lymphadenopathy [No Stigmata of Cushings Syndrome] : no stigmata of Cushings Syndrome [Normal Gait] : normal gait [Normal Reflexes] : deep tendon reflexes were 2+ and symmetric [No Tremors] : no tremors [Oriented x3] : oriented to person, place, and time [de-identified] : thyroidectomy scar no thyroid nodes

## 2023-02-01 NOTE — END OF VISIT
[FreeTextEntry3] : This note was written by Kay Willett on ( January 31, 2023 ) acting as a medical scribe for Dr. Valle.  This note was authored by the medical scribe for me. I have reviewed, edited, and revised the note as needed. I am in agreement with the exam findings, imaging findings, and treatment plan.  Jose Valle MD

## 2023-02-01 NOTE — ASSESSMENT
[Levothyroxine] : The patient was instructed to take Levothyroxine on an empty stomach, separate from vitamins, and wait at least 30 minutes before eating [FreeTextEntry1] : 86 yo male with \par \par 1. Thyroid: H/o papillary thyroid CA: 2005, WANDA, clinically and chemically euthyroid on LT4 100 mcg daily for hypothyroidism. No local neck pain. No dysphagia or dysphonia. No raciness, shakiness, heat/cold intolerance, tiredness, or fatigue. No palpitations, tremors, or sudden weight gain/loss. Maintain low-normal TSH.\par \par 2.  Osteoporosis.:  . BMD 1/2023 shows that there has been a decrease in bone density in the proximal radius but hip bone density remains fairly average.. BMD results reviewed w/ pt. Pt risk of future fx is still low. \par Controversy concerning treatment of male osteoporosis based on low forearm only with average hip discussed.  I recommend against medical therapy at this time\par  \par \par \par Call For Labs Dr Alaniz\par \par F/u w BMD in 1 year

## 2023-02-01 NOTE — HISTORY OF PRESENT ILLNESS
[FreeTextEntry1] : Patient returns for a follow up visit for hypothyroidism, thyroid cancer and osteoporosis. Pt reports that he has been dx with GERD. Pt is now taking famotidine. t also reports having cataract surgery. Procedure went well. \par \par Thyroid ; \par H/o papillary thyroid CA in 2005; multicentric follicular variant. Chemically euthyroid on LT4 100 mcg daily. No sx of hyper on hypothyroidism. No local neck pain. No dysphagia or dysphonia. No raciness, shakiness, heat/cold intolerance, tiredness, or fatigue. No palpitations, tremors, or sudden weight gain/loss. \par \par Osteopenia: Pt has mildly low bone density in the proximal radius. I do not recommend treatment. Observe \par \par Mild GERD. Wt has been stable. Pt takes multivitamin daily.\par \par Pt had cataract surgery in 2019 with improved vision but starburst issue still present. \par \par Pt c/o some hip pain due to arthritis. \par

## 2023-02-21 ENCOUNTER — NON-APPOINTMENT (OUTPATIENT)
Age: 88
End: 2023-02-21

## 2023-02-21 ENCOUNTER — APPOINTMENT (OUTPATIENT)
Dept: OPHTHALMOLOGY | Facility: CLINIC | Age: 88
End: 2023-02-21
Payer: MEDICARE

## 2023-02-21 PROCEDURE — 99024 POSTOP FOLLOW-UP VISIT: CPT

## 2023-03-08 ENCOUNTER — NON-APPOINTMENT (OUTPATIENT)
Age: 88
End: 2023-03-08

## 2023-03-12 ENCOUNTER — NON-APPOINTMENT (OUTPATIENT)
Age: 88
End: 2023-03-12

## 2023-03-30 ENCOUNTER — RX RENEWAL (OUTPATIENT)
Age: 88
End: 2023-03-30

## 2023-05-23 ENCOUNTER — APPOINTMENT (OUTPATIENT)
Dept: OPHTHALMOLOGY | Facility: CLINIC | Age: 88
End: 2023-05-23

## 2023-08-22 ENCOUNTER — NON-APPOINTMENT (OUTPATIENT)
Age: 88
End: 2023-08-22

## 2023-08-22 ENCOUNTER — APPOINTMENT (OUTPATIENT)
Dept: OPHTHALMOLOGY | Facility: CLINIC | Age: 88
End: 2023-08-22
Payer: MEDICARE

## 2023-08-22 PROCEDURE — 92014 COMPRE OPH EXAM EST PT 1/>: CPT

## 2023-08-22 PROCEDURE — 92134 CPTRZ OPH DX IMG PST SGM RTA: CPT

## 2023-09-05 ENCOUNTER — APPOINTMENT (OUTPATIENT)
Dept: CARDIOLOGY | Facility: CLINIC | Age: 88
End: 2023-09-05
Payer: MEDICARE

## 2023-09-05 VITALS — DIASTOLIC BLOOD PRESSURE: 88 MMHG | SYSTOLIC BLOOD PRESSURE: 125 MMHG

## 2023-09-05 VITALS
HEIGHT: 67 IN | DIASTOLIC BLOOD PRESSURE: 76 MMHG | HEART RATE: 61 BPM | SYSTOLIC BLOOD PRESSURE: 138 MMHG | BODY MASS INDEX: 27 KG/M2 | WEIGHT: 172 LBS | OXYGEN SATURATION: 99 %

## 2023-09-05 PROCEDURE — 99214 OFFICE O/P EST MOD 30 MIN: CPT

## 2023-09-05 PROCEDURE — 93000 ELECTROCARDIOGRAM COMPLETE: CPT

## 2023-09-05 NOTE — DISCUSSION/SUMMARY
[FreeTextEntry1] : Tim is a 67yoM PMH hypothyroidism, osteopenia and thyroid cancer who presents as a referral from Dr. Alaniz for bradycardia.   EKG shows sinus bradycardia at 60. He is asymptomatic and in excellent shape, going to HIIT classes twice per week.   TTE done last year showed preserved LV and RV function but with mild dilation of aortic root.  Will repeat TTE in 6 months and will follow up in 6 months as well.  [EKG obtained to assist in diagnosis and management of assessed problem(s)] : EKG obtained to assist in diagnosis and management of assessed problem(s)

## 2023-09-05 NOTE — HISTORY OF PRESENT ILLNESS
[FreeTextEntry1] : Tim is a 67yoM PMH hypothyroidism, osteopenia and thyroid cancer who presents as a referral from Dr. Alaniz for bradycardia.   He was last seen by Dr. Brice in June of 2022. He had been having some chest discomfort at that time. He was ordered for TTE and exercise nuclear stress.   Doing HIIT at the gym twice per week and feels very well. Denies chest pain, SOB, palpitations.   He has no known history of HTN, DM, CAD.

## 2023-09-05 NOTE — PHYSICAL EXAM

## 2023-09-05 NOTE — CARDIOLOGY SUMMARY
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 Mild TR. Echo: 3/07, normal LV function, no pulmonary hypertension, normal LA size, no mitral regurgitation LVEF 70%.

## 2023-09-22 ENCOUNTER — APPOINTMENT (OUTPATIENT)
Dept: ORTHOPEDIC SURGERY | Facility: CLINIC | Age: 88
End: 2023-09-22
Payer: MEDICARE

## 2023-09-22 VITALS — WEIGHT: 172 LBS | HEIGHT: 67 IN | BODY MASS INDEX: 27 KG/M2

## 2023-09-22 PROCEDURE — J3490M: CUSTOM | Mod: NC

## 2023-09-22 PROCEDURE — 20550 NJX 1 TENDON SHEATH/LIGAMENT: CPT | Mod: RT

## 2023-09-22 PROCEDURE — 99213 OFFICE O/P EST LOW 20 MIN: CPT | Mod: 25

## 2023-10-04 ENCOUNTER — APPOINTMENT (OUTPATIENT)
Dept: CARDIOLOGY | Facility: CLINIC | Age: 88
End: 2023-10-04
Payer: MEDICARE

## 2023-10-04 PROCEDURE — 93306 TTE W/DOPPLER COMPLETE: CPT

## 2024-01-31 ENCOUNTER — APPOINTMENT (OUTPATIENT)
Dept: ENDOCRINOLOGY | Facility: CLINIC | Age: 89
End: 2024-01-31
Payer: MEDICARE

## 2024-01-31 VITALS
HEART RATE: 60 BPM | BODY MASS INDEX: 26.63 KG/M2 | WEIGHT: 170 LBS | DIASTOLIC BLOOD PRESSURE: 75 MMHG | OXYGEN SATURATION: 96 % | SYSTOLIC BLOOD PRESSURE: 130 MMHG

## 2024-01-31 DIAGNOSIS — M81.0 AGE-RELATED OSTEOPOROSIS W/OUT CURRENT PATHOLOGICAL FRACTURE: ICD-10-CM

## 2024-01-31 DIAGNOSIS — C73 MALIGNANT NEOPLASM OF THYROID GLAND: ICD-10-CM

## 2024-01-31 PROCEDURE — 99214 OFFICE O/P EST MOD 30 MIN: CPT

## 2024-01-31 PROCEDURE — G2211 COMPLEX E/M VISIT ADD ON: CPT

## 2024-02-01 PROBLEM — M81.0 OSTEOPOROSIS: Status: ACTIVE | Noted: 2023-02-01

## 2024-02-01 NOTE — HISTORY OF PRESENT ILLNESS
[FreeTextEntry1] : Patient returns for a follow up visit for hypothyroidism, thyroid cancer and osteoporosis. Pt reports that he feels tired.  Otherwise, no significant interval health changes. No interval surgery, hospitalizations, fractures, or change in medications.  H/o papillary thyroid CA in 2005; multicentric follicular variant. Chemically euthyroid on LT4 100 mcg daily. No sx of hyper on hypothyroidism. No local neck pains. No dysphagia or dysphonia. Pt c/o tiredness, and fatigue. No raciness, shakiness, or heat/cold intolerance. No palpitations, tremors, or sudden weight gain/loss.   Osteopenia: Pt has mildly low bone density in the proximal radius. No treatment recommended. Observe   PMHx: Mild GERD. Stable. Pt takes multivitamin daily. Cataract surgery in 2019 with improved vision but starburst issue still present.  c/o hip pain due to arthritis.

## 2024-02-01 NOTE — ASSESSMENT
[Levothyroxine] : The patient was instructed to take Levothyroxine on an empty stomach, separate from vitamins, and wait at least 30 minutes before eating [FreeTextEntry1] : 87 y/o male with:  Thyroid: H/o papillary thyroid CA: 2005, WANDA, clinically and chemically euthyroid on LT4 100 mcg daily for hypothyroidism. Pt c/o tiredness, or fatigue. No local neck pains. No dysphagia or dysphonia. No raciness, shakiness, heat/cold intolerance. No palpitations, tremors, or sudden weight gain/loss. Maintain low-normal TSH.  Osteoporosis: BMD 1/2023 shows that there has been a decrease in bone density in the proximal radius, but hip bone density remains fairly average. BMD results reviewed w/ pt. Pt risk of future fx is still low. Controversy concerning treatment of male osteoporosis based on low forearm only with average hip discussed.  I recommended against medical therapy at that time. I will re-evaulate based on BMD and proceeds accordingly.    Call Dr. Hanna for Labs  F/u in 6 months for BMD.

## 2024-02-01 NOTE — PHYSICAL EXAM
[Alert] : alert [Well Nourished] : well nourished [No Acute Distress] : no acute distress [Well Developed] : well developed [Normal Sclera/Conjunctiva] : normal sclera/conjunctiva [EOMI] : extra ocular movement intact [No Proptosis] : no proptosis [Thyroid Not Enlarged] : the thyroid was not enlarged [No Thyroid Nodules] : no palpable thyroid nodules [Well Healed Scar] : well healed scar [Clear to Auscultation] : lungs were clear to auscultation bilaterally [Normal S1, S2] : normal S1 and S2 [Normal Rate] : heart rate was normal [Regular Rhythm] : with a regular rhythm [No Edema] : no peripheral edema [Normal Bowel Sounds] : normal bowel sounds [Not Tender] : non-tender [Not Distended] : not distended [Soft] : abdomen soft [Normal Anterior Cervical Nodes] : no anterior cervical lymphadenopathy [No Stigmata of Cushings Syndrome] : no stigmata of Cushings Syndrome [Normal Gait] : normal gait [Normal Reflexes] : deep tendon reflexes were 2+ and symmetric [No Tremors] : no tremors [Oriented x3] : oriented to person, place, and time [de-identified] : Small Thyroid scar

## 2024-02-01 NOTE — END OF VISIT
[FreeTextEntry3] : I, Gerald Lu, authored this note working as a medical scribe for Dr. Valle.  01/31/2024.  This note was authored by the medical scribe for me. I have reviewed, edited, and revised the note as needed. I am in agreement with the exam findings, imaging findings, and treatment plan.  Jose Valle MD

## 2024-02-09 ENCOUNTER — APPOINTMENT (OUTPATIENT)
Dept: ORTHOPEDIC SURGERY | Facility: CLINIC | Age: 89
End: 2024-02-09
Payer: MEDICARE

## 2024-02-09 VITALS — BODY MASS INDEX: 26.68 KG/M2 | HEIGHT: 67 IN | WEIGHT: 170 LBS

## 2024-02-09 PROCEDURE — 99213 OFFICE O/P EST LOW 20 MIN: CPT | Mod: 57

## 2024-02-09 NOTE — ASSESSMENT
[FreeTextEntry1] : R RF trigger release R/B/A of surgery discussed with the patient. Risks including but not limited to infection, nerve damage, tendon damage, pain, stiffness, recurrence, no resolution of symptoms, loss of function, limb or life. They understand and agree to surgery  Return post op

## 2024-02-09 NOTE — HISTORY OF PRESENT ILLNESS
[5] : 5 [Dull/Aching] : dull/aching [de-identified] : R RF trigger Injeciton in Sept helped until recently [FreeTextEntry1] : N[facility] DIAL  [de-identified] : inj

## 2024-02-09 NOTE — PHYSICAL EXAM
[de-identified] : R hand:  Mild swelling  Tender 4th A1 pulley  Decreased ring ROM  +ring triggering

## 2024-02-28 ENCOUNTER — APPOINTMENT (OUTPATIENT)
Dept: CARDIOLOGY | Facility: CLINIC | Age: 89
End: 2024-02-28
Payer: MEDICARE

## 2024-02-28 PROCEDURE — 93306 TTE W/DOPPLER COMPLETE: CPT

## 2024-03-06 ENCOUNTER — APPOINTMENT (OUTPATIENT)
Dept: ORTHOPEDIC SURGERY | Facility: AMBULATORY SURGERY CENTER | Age: 89
End: 2024-03-06
Payer: MEDICARE

## 2024-03-06 PROCEDURE — 26055 INCISE FINGER TENDON SHEATH: CPT | Mod: F8

## 2024-03-06 RX ORDER — HYDROCODONE BITARTRATE AND ACETAMINOPHEN 5; 325 MG/1; MG/1
5-325 TABLET ORAL 4 TIMES DAILY
Qty: 20 | Refills: 0 | Status: ACTIVE | COMMUNITY
Start: 2024-03-06 | End: 1900-01-01

## 2024-03-13 ENCOUNTER — APPOINTMENT (OUTPATIENT)
Dept: CARDIOLOGY | Facility: CLINIC | Age: 89
End: 2024-03-13
Payer: MEDICARE

## 2024-03-13 ENCOUNTER — NON-APPOINTMENT (OUTPATIENT)
Age: 89
End: 2024-03-13

## 2024-03-13 VITALS
HEIGHT: 67 IN | DIASTOLIC BLOOD PRESSURE: 77 MMHG | BODY MASS INDEX: 26.68 KG/M2 | WEIGHT: 170 LBS | HEART RATE: 64 BPM | OXYGEN SATURATION: 98 % | SYSTOLIC BLOOD PRESSURE: 129 MMHG

## 2024-03-13 DIAGNOSIS — R00.1 BRADYCARDIA, UNSPECIFIED: ICD-10-CM

## 2024-03-13 DIAGNOSIS — I77.810 THORACIC AORTIC ECTASIA: ICD-10-CM

## 2024-03-13 PROCEDURE — 93000 ELECTROCARDIOGRAM COMPLETE: CPT

## 2024-03-13 PROCEDURE — G2211 COMPLEX E/M VISIT ADD ON: CPT

## 2024-03-13 PROCEDURE — 99214 OFFICE O/P EST MOD 30 MIN: CPT

## 2024-03-13 NOTE — DISCUSSION/SUMMARY
[EKG obtained to assist in diagnosis and management of assessed problem(s)] : EKG obtained to assist in diagnosis and management of assessed problem(s) [FreeTextEntry1] : Tim is a 67yoM PMH hypothyroidism, bradycardia osteopenia and thyroid cancer who presents for follow up  EKG shows sinus bradycardia at 60. He is asymptomatic and in excellent shape, going to HIIT classes twice per week.  TTE done in 6/2022 showed preserved LV and RV function but with mild dilation of aortic root. A repeat TTE showed normal sized aortic root and mild elevated gradients in the LVOT up to 8mmHg with valsalva.  Recent blood work to be obtained from Dr. Alaniz.   Will follow up in 9 months.

## 2024-03-13 NOTE — PHYSICAL EXAM
[Well Developed] : well developed [No Acute Distress] : no acute distress [Well Nourished] : well nourished [Normal Conjunctiva] : normal conjunctiva [Normal Venous Pressure] : normal venous pressure [No Carotid Bruit] : no carotid bruit [No Rub] : no rub [No Gallop] : no gallop [Clear Lung Fields] : clear lung fields [Good Air Entry] : good air entry [Soft] : abdomen soft [No Respiratory Distress] : no respiratory distress  [No Masses/organomegaly] : no masses/organomegaly [Non Tender] : non-tender [Normal Bowel Sounds] : normal bowel sounds [Normal Gait] : normal gait [No Edema] : no edema [No Cyanosis] : no cyanosis [No Clubbing] : no clubbing [No Varicosities] : no varicosities [No Rash] : no rash [No Skin Lesions] : no skin lesions [Moves all extremities] : moves all extremities [No Focal Deficits] : no focal deficits [Alert and Oriented] : alert and oriented [Normal Speech] : normal speech [Normal memory] : normal memory [de-identified] : 1/6 systolic murmur, bradycardic

## 2024-03-13 NOTE — CARDIOLOGY SUMMARY
[TextEntry] : EC23: sinus bradycardia no ST changes   Stress Test: Stress Test: , no Ischemia 2022: exercise stress: no EKG abnormalities   Echo: TTE 2022: Mild MR, Mild AI, Mild dilation of aortic root and prox ascending aorta at 4.2 cm, normal LV and RV function. Mild TR. Echo: 3/07, normal LV function, no pulmonary hypertension, normal LA size, no mitral regurgitation LVEF 70%.  TTE 2024: CONCLUSIONS: 1. Left ventricular systolic function is normal with an ejection fraction of 64 % by Joseph's method of disks. 2. There is normal LV mass and concentric remodeling. 3. There is discrete basal septal hypertrophy measuring 1.7cm. 4. The left ventricular outflow tract resting gradient is 4 mmHg and 8 mmHg using the Valsalva maneuver. 5. Normal right ventricular cavity size and normal systolic function. 6. The left atrium is normal. 7. Mitral valve leaflets have focal calcification. 8. Trace mitral regurgitation. 9. Trileaflet aortic valve with normal systolic excursion. 10. Mild aortic regurgitation. 11. Estimated pulmonary artery systolic pressure is 33 mmHg, consistent with normal pulmonary artery pressure. Aorta 3.80 cm 12. Compared to the transthoracic echocardiogram performed on 2022, there have been no significant interval changes.  EKG 3/13/24: sinus bradycardia, PVC x 1

## 2024-03-13 NOTE — HISTORY OF PRESENT ILLNESS
[FreeTextEntry1] : Tim is a 67yoM PMH hypothyroidism, bradycardia osteopenia and thyroid cancer who presents for follow up  He was last seen in September of 2023 for bradycardia.  Doing HIIT at the gym twice per week and feels very well. Denies chest pain, SOB, palpitations.  He has no known history of HTN, DM, CAD.

## 2024-03-15 ENCOUNTER — APPOINTMENT (OUTPATIENT)
Dept: ORTHOPEDIC SURGERY | Facility: CLINIC | Age: 89
End: 2024-03-15
Payer: MEDICARE

## 2024-03-15 VITALS — WEIGHT: 170 LBS | BODY MASS INDEX: 26.68 KG/M2 | HEIGHT: 67 IN

## 2024-03-15 DIAGNOSIS — M65.341 TRIGGER FINGER, RIGHT RING FINGER: ICD-10-CM

## 2024-03-15 PROCEDURE — 99024 POSTOP FOLLOW-UP VISIT: CPT

## 2024-03-15 NOTE — PHYSICAL EXAM
[de-identified] : Mild hand swelling Healed incision No evidence of infection Mild tenderness at the surgical site Good finger ROM No trigger

## 2024-03-15 NOTE — HISTORY OF PRESENT ILLNESS
[] : Post Surgical Visit: yes [Dull/Aching] : dull/aching [de-identified] : R RF trigger release  [3] : 3 [1] : 2 [FreeTextEntry1] : [facility] DIAL [de-identified] : 3/6/24 [de-identified] : R RF trigger release

## 2024-03-15 NOTE — REASON FOR VISIT
[FreeTextEntry2] : PO#1 R RF  Ear Wedge Repair Text: A wedge excision was completed by carrying down an excision through the full thickness of the ear and cartilage with an inward facing Burow's triangle. The wound was then closed in a layered fashion.

## 2024-06-10 ENCOUNTER — NON-APPOINTMENT (OUTPATIENT)
Age: 89
End: 2024-06-10

## 2024-06-11 ENCOUNTER — NON-APPOINTMENT (OUTPATIENT)
Age: 89
End: 2024-06-11

## 2024-06-11 ENCOUNTER — APPOINTMENT (OUTPATIENT)
Dept: OPHTHALMOLOGY | Facility: CLINIC | Age: 89
End: 2024-06-11
Payer: MEDICARE

## 2024-06-11 PROCEDURE — 92014 COMPRE OPH EXAM EST PT 1/>: CPT

## 2024-06-11 PROCEDURE — 92134 CPTRZ OPH DX IMG PST SGM RTA: CPT

## 2024-08-13 ENCOUNTER — APPOINTMENT (OUTPATIENT)
Dept: ENDOCRINOLOGY | Facility: CLINIC | Age: 89
End: 2024-08-13
Payer: MEDICARE

## 2024-08-13 VITALS
OXYGEN SATURATION: 99 % | BODY MASS INDEX: 26.37 KG/M2 | WEIGHT: 170 LBS | HEART RATE: 54 BPM | DIASTOLIC BLOOD PRESSURE: 72 MMHG | HEIGHT: 67.25 IN | SYSTOLIC BLOOD PRESSURE: 118 MMHG

## 2024-08-13 DIAGNOSIS — C73 MALIGNANT NEOPLASM OF THYROID GLAND: ICD-10-CM

## 2024-08-13 DIAGNOSIS — E03.9 HYPOTHYROIDISM, UNSPECIFIED: ICD-10-CM

## 2024-08-13 DIAGNOSIS — M81.0 AGE-RELATED OSTEOPOROSIS W/OUT CURRENT PATHOLOGICAL FRACTURE: ICD-10-CM

## 2024-08-13 PROCEDURE — 99214 OFFICE O/P EST MOD 30 MIN: CPT

## 2024-08-13 PROCEDURE — 77080 DXA BONE DENSITY AXIAL: CPT | Mod: GA

## 2024-08-13 PROCEDURE — G2211 COMPLEX E/M VISIT ADD ON: CPT

## 2024-08-13 PROCEDURE — ZZZZZ: CPT

## 2024-08-13 NOTE — PHYSICAL EXAM
[Alert] : alert [Well Nourished] : well nourished [No Acute Distress] : no acute distress [Well Developed] : well developed [Normal Sclera/Conjunctiva] : normal sclera/conjunctiva [EOMI] : extra ocular movement intact [No Proptosis] : no proptosis [Thyroid Not Enlarged] : the thyroid was not enlarged [No Thyroid Nodules] : no palpable thyroid nodules [Well Healed Scar] : well healed scar [Clear to Auscultation] : lungs were clear to auscultation bilaterally [Normal S1, S2] : normal S1 and S2 [Normal Rate] : heart rate was normal [Regular Rhythm] : with a regular rhythm [No Edema] : no peripheral edema [Normal Bowel Sounds] : normal bowel sounds [Not Tender] : non-tender [Not Distended] : not distended [Soft] : abdomen soft [Normal Anterior Cervical Nodes] : no anterior cervical lymphadenopathy [No Stigmata of Cushings Syndrome] : no stigmata of Cushings Syndrome [Normal Gait] : normal gait [Normal Reflexes] : deep tendon reflexes were 2+ and symmetric [No Tremors] : no tremors [Oriented x3] : oriented to person, place, and time [de-identified] : Small Thyroid scar

## 2024-08-13 NOTE — PROCEDURE
[FreeTextEntry1] : Bone Mineral Density: 08/13/2024 Indication: Comparison to 2023, prior test showed bone loss (prox. radius) Spine: falsely elevated due to arthritis Total hip: -0.7, normal, no significant change Femoral neck: -1.3 osteopenia, no significant change Proximal radius: -2.6, osteoporosis, +3.8%  Bone Density 1/31/2023 Indication vs 2022 Prior test showed bone loss  Spine L1-L2 -1.1 osteopenia normal , no significant change  Total Hip -09 normal , no significant change  Femoral Neck -1.4 osteopenia , no significant change  Proximal Radius -3.0 osteoporosis -3.9%   Bone mineral density: 01/14/2022  Indication: vs. 2019 Spine: (L1-L2) -1.3 osteopenia, no significant change Total hip: -0.9 normal, -3.3% Femoral neck: -1.6 osteopenia, -4.3% Proximal radius: -2.5 osteoporosis, no significant change  Bone mineral density: 03/05/2019  Indication: vs. 2008 Spine: (L1-L2) -1.2 osteopenia, no significant change  Total hip: -0.7 normal (-8.4%) Femoral neck: -1.3 osteopenia (-9.0%) Proximal radius: -2.2 osteopenia, no prior

## 2024-08-13 NOTE — END OF VISIT
[FreeTextEntry3] :  This note was written by Dacia Torres on (August 13, 2024) acting as a medical scribe for Dr. Valle This note was authored by the medical scribe for me. I have reviewed, edited, and revised the note as needed. I am in agreement with the exam findings, imaging findings, and treatment plan.  Jose Valle MD

## 2024-08-13 NOTE — ASSESSMENT
[Levothyroxine] : The patient was instructed to take Levothyroxine on an empty stomach, separate from vitamins, and wait at least 30 minutes before eating [FreeTextEntry1] : 89 y/o male with:  Thyroid: H/o papillary thyroid CA: 2005, WANDA, clinically and chemically euthyroid on LT4 100 mcg daily for hypothyroidism. Pt c/o tiredness, or fatigue. No local neck pains. No dysphagia or dysphonia. No raciness, shakiness, heat/cold intolerance. No palpitations, tremors, or sudden weight gain/loss. Maintain low-normal TSH.  Osteoporosis:  BMD 3/2019 vs 2008 sl decrease in hip though still consistent with osteopenia and not high risk for future fx based on this value. BMD results reviewed w/ pt.  This is not a clear indication for men as it was determined from a population of postmenopausal women. BMD 01/2022 indicates stable osteopenia, worsened normal total hip, worsened osteopenia in femoral neck, and stable osteoporosis in proximal radius. BMD results reviewed w/ pt. Pt risk of future fx is still low. No indication for additional osteoporosis rx at this time. BMD 01/2023 shows that there has been a decrease in bone density in the proximal radius, but hip bone density remains fairly average. BMD results reviewed w/ pt. Pt risk of future fx is still low. BMD 08/2024 indicates normal total hip, stable osteopenia in the spine and increased osteoporosis in prox. radius. BMD results reviewed w/pt. I recommended against medical therapy at that time. I will re-evaulate based on BMD and proceeds accordingly.   F/u in 6 months

## 2024-08-13 NOTE — HISTORY OF PRESENT ILLNESS
[FreeTextEntry1] : Patient returns for a follow up visit for hypothyroidism, thyroid cancer and osteoporosis. No interval health changes. No major surgeries, hospitalizations, fractures or changes in medication. Up to date with dentist. No major dental work planned.   H/o papillary thyroid CA in 2005; multicentric follicular variant. Chemically euthyroid on LT4 100 mcg daily. No sx of hyper on hypothyroidism. No local neck pains. No dysphagia or dysphonia. Pt c/o tiredness, and fatigue. No raciness, shakiness, or heat/cold intolerance. No palpitations, tremors, or sudden weight gain/loss.      Osteopenia: BMD 3/2019 vs 2008 sl decrease in hip though still consistent with osteopenia and not high risk for future fx based on this value. BMD results reviewed w/ pt.  This is not a clear indication for men as it was determined from a population of postmenopausal women. BMD 01/2022 indicates stable osteopenia, worsened normal total hip, worsened osteopenia in femoral neck, and stable osteoporosis in proximal radius. BMD results reviewed w/ pt. Pt risk of future fx is still low. No indication for additional osteoporosis rx at this time. BMD 01/2023 shows that there has been a decrease in bone density in the proximal radius, but hip bone density remains fairly average. BMD results reviewed w/ pt. Pt risk of future fx is still low. BMD 08/2024 indicates normal total hip, stable osteopenia in the spine and increased osteoporosis in prox. radius. BMD results reviewed w/pt. I recommended against medical therapy at that time. I will re-evaulate based on BMD and proceeds accordingly.   PMHx: Mild GERD. Stable. Pt takes multivitamin daily. Cataract surgery in 2019 with improved vision but starburst issue still present.  c/o hip pain due to arthritis.

## 2024-09-09 ENCOUNTER — APPOINTMENT (OUTPATIENT)
Dept: CARDIOLOGY | Facility: CLINIC | Age: 89
End: 2024-09-09
Payer: MEDICARE

## 2024-09-09 ENCOUNTER — NON-APPOINTMENT (OUTPATIENT)
Age: 89
End: 2024-09-09

## 2024-09-09 VITALS
BODY MASS INDEX: 26.52 KG/M2 | HEIGHT: 67.25 IN | WEIGHT: 171 LBS | HEART RATE: 61 BPM | OXYGEN SATURATION: 99 % | DIASTOLIC BLOOD PRESSURE: 88 MMHG | SYSTOLIC BLOOD PRESSURE: 148 MMHG

## 2024-09-09 DIAGNOSIS — I77.810 THORACIC AORTIC ECTASIA: ICD-10-CM

## 2024-09-09 DIAGNOSIS — R00.1 BRADYCARDIA, UNSPECIFIED: ICD-10-CM

## 2024-09-09 PROCEDURE — 99214 OFFICE O/P EST MOD 30 MIN: CPT

## 2024-09-09 PROCEDURE — G2211 COMPLEX E/M VISIT ADD ON: CPT

## 2024-09-09 PROCEDURE — 93000 ELECTROCARDIOGRAM COMPLETE: CPT

## 2024-09-09 NOTE — HISTORY OF PRESENT ILLNESS
[FreeTextEntry1] : Tim is a 67yoM PMH hypothyroidism, bradycardia, osteopenia and thyroid cancer who presents for follow up  He was last seen in March of 2024 for follow up. Was doing HIIT at the gym twice per week and feeling very well.   TTE done in 6/2022 showed preserved LV and RV function but with mild dilation of aortic root. A repeat TTE showed normal sized aortic root and mild elevated gradients in the LVOT up to 8mmHg with valsalva.  lipids 8/2024: tc 186 hdl 58 ldl c 112 a1c 5.5%

## 2024-09-09 NOTE — DISCUSSION/SUMMARY
[EKG obtained to assist in diagnosis and management of assessed problem(s)] : EKG obtained to assist in diagnosis and management of assessed problem(s) [FreeTextEntry1] : Tim is a 67yoM PMH hypothyroidism, bradycardia osteopenia and thyroid cancer who presents for follow up  EKG shows sinus bradycardia at 60. He is asymptomatic and in excellent shape, going to HIIT classes twice per week.  TTE done in 6/2022 showed preserved LV and RV function but with mild dilation of aortic root. A repeat TTE done in 2/204 showed normal sized aortic root and mild elevated gradients in the LVOT up to 8mmHg with valsalva. I have advised increase hydration especially pre workout and during his workouts.   His BP is elevated today but usually his BPs are very well controlled without anti hypertensives. We will continue to monitor this.   Will follow up in 9-12 months.

## 2024-09-09 NOTE — CARDIOLOGY SUMMARY
[TextEntry] : EC23: sinus bradycardia no ST changes Stress Test: Stress Test: , no Ischemia 2022: exercise stress: no EKG abnormalities Echo: TTE 2022: Mild MR, Mild AI, Mild dilation of aortic root and prox ascending aorta at 4.2 cm, normal LV and RV function. Mild TR. Echo: 3/07, normal LV function, no pulmonary hypertension, normal LA size, no mitral regurgitation LVEF 70%.  TTE 2024: CONCLUSIONS: 1. Left ventricular systolic function is normal with an ejection fraction of 64 % by Joseph's method of disks. 2. There is normal LV mass and concentric remodeling. 3. There is discrete basal septal hypertrophy measuring 1.7cm. 4. The left ventricular outflow tract resting gradient is 4 mmHg and 8 mmHg using the Valsalva maneuver. 5. Normal right ventricular cavity size and normal systolic function. 6. The left atrium is normal. 7. Mitral valve leaflets have focal calcification. 8. Trace mitral regurgitation. 9. Trileaflet aortic valve with normal systolic excursion. 10. Mild aortic regurgitation. 11. Estimated pulmonary artery systolic pressure is 33 mmHg, consistent with normal pulmonary artery pressure. Aorta 3.80 cm 12. Compared to the transthoracic echocardiogram performed on 2022, there have been no significant interval changes.  EKG 3/13/24: sinus bradycardia, PVC x 1 EKG 24: SB 59, normal

## 2024-10-03 ENCOUNTER — NON-APPOINTMENT (OUTPATIENT)
Age: 89
End: 2024-10-03

## 2024-10-09 ENCOUNTER — APPOINTMENT (OUTPATIENT)
Dept: OPHTHALMOLOGY | Facility: CLINIC | Age: 89
End: 2024-10-09

## 2024-10-10 ENCOUNTER — NON-APPOINTMENT (OUTPATIENT)
Age: 89
End: 2024-10-10

## 2024-10-10 ENCOUNTER — APPOINTMENT (OUTPATIENT)
Dept: OPHTHALMOLOGY | Facility: CLINIC | Age: 89
End: 2024-10-10
Payer: MEDICARE

## 2024-10-10 PROCEDURE — 92012 INTRM OPH EXAM EST PATIENT: CPT

## 2024-10-24 ENCOUNTER — APPOINTMENT (OUTPATIENT)
Dept: OPHTHALMOLOGY | Facility: CLINIC | Age: 89
End: 2024-10-24
Payer: MEDICARE

## 2024-10-24 ENCOUNTER — NON-APPOINTMENT (OUTPATIENT)
Age: 89
End: 2024-10-24

## 2024-10-24 PROCEDURE — 92012 INTRM OPH EXAM EST PATIENT: CPT

## 2025-02-12 ENCOUNTER — APPOINTMENT (OUTPATIENT)
Dept: ENDOCRINOLOGY | Facility: CLINIC | Age: 89
End: 2025-02-12
Payer: MEDICARE

## 2025-02-12 VITALS
WEIGHT: 172 LBS | HEART RATE: 100 BPM | DIASTOLIC BLOOD PRESSURE: 80 MMHG | HEIGHT: 67.25 IN | SYSTOLIC BLOOD PRESSURE: 140 MMHG | BODY MASS INDEX: 26.68 KG/M2 | OXYGEN SATURATION: 99 %

## 2025-02-12 DIAGNOSIS — M81.0 AGE-RELATED OSTEOPOROSIS W/OUT CURRENT PATHOLOGICAL FRACTURE: ICD-10-CM

## 2025-02-12 DIAGNOSIS — C73 MALIGNANT NEOPLASM OF THYROID GLAND: ICD-10-CM

## 2025-02-12 DIAGNOSIS — E03.9 HYPOTHYROIDISM, UNSPECIFIED: ICD-10-CM

## 2025-02-12 PROCEDURE — 99214 OFFICE O/P EST MOD 30 MIN: CPT

## 2025-02-12 PROCEDURE — G2211 COMPLEX E/M VISIT ADD ON: CPT

## 2025-03-03 ENCOUNTER — RX RENEWAL (OUTPATIENT)
Age: 89
End: 2025-03-03

## 2025-03-11 ENCOUNTER — NON-APPOINTMENT (OUTPATIENT)
Age: 89
End: 2025-03-11

## 2025-03-11 ENCOUNTER — APPOINTMENT (OUTPATIENT)
Dept: CARDIOLOGY | Facility: CLINIC | Age: 89
End: 2025-03-11
Payer: MEDICARE

## 2025-03-11 VITALS
HEIGHT: 67.25 IN | DIASTOLIC BLOOD PRESSURE: 74 MMHG | HEART RATE: 62 BPM | WEIGHT: 179 LBS | OXYGEN SATURATION: 99 % | SYSTOLIC BLOOD PRESSURE: 157 MMHG | BODY MASS INDEX: 27.77 KG/M2

## 2025-03-11 VITALS — DIASTOLIC BLOOD PRESSURE: 78 MMHG | SYSTOLIC BLOOD PRESSURE: 149 MMHG

## 2025-03-11 DIAGNOSIS — R00.1 BRADYCARDIA, UNSPECIFIED: ICD-10-CM

## 2025-03-11 DIAGNOSIS — I77.810 THORACIC AORTIC ECTASIA: ICD-10-CM

## 2025-03-11 PROCEDURE — 99214 OFFICE O/P EST MOD 30 MIN: CPT

## 2025-03-11 PROCEDURE — G2211 COMPLEX E/M VISIT ADD ON: CPT

## 2025-03-11 PROCEDURE — 93000 ELECTROCARDIOGRAM COMPLETE: CPT

## 2025-03-24 ENCOUNTER — APPOINTMENT (OUTPATIENT)
Dept: CARDIOLOGY | Facility: CLINIC | Age: 89
End: 2025-03-24

## 2025-03-24 PROCEDURE — 93790 AMBL BP MNTR W/SW I&R: CPT

## 2025-03-25 ENCOUNTER — APPOINTMENT (OUTPATIENT)
Dept: CARDIOLOGY | Facility: CLINIC | Age: 89
End: 2025-03-25
Payer: MEDICARE

## 2025-03-25 VITALS — DIASTOLIC BLOOD PRESSURE: 76 MMHG | SYSTOLIC BLOOD PRESSURE: 140 MMHG | HEART RATE: 59 BPM

## 2025-03-25 PROCEDURE — 99211 OFF/OP EST MAY X REQ PHY/QHP: CPT

## 2025-03-28 DIAGNOSIS — I10 ESSENTIAL (PRIMARY) HYPERTENSION: ICD-10-CM

## 2025-03-28 RX ORDER — AMLODIPINE BESYLATE 2.5 MG/1
2.5 TABLET ORAL DAILY
Qty: 90 | Refills: 3 | Status: ACTIVE | COMMUNITY
Start: 2025-03-28 | End: 1900-01-01

## 2025-04-03 ENCOUNTER — TRANSCRIPTION ENCOUNTER (OUTPATIENT)
Age: 89
End: 2025-04-03

## 2025-05-22 ENCOUNTER — RX RENEWAL (OUTPATIENT)
Age: 89
End: 2025-05-22

## 2025-06-13 ENCOUNTER — NON-APPOINTMENT (OUTPATIENT)
Age: 89
End: 2025-06-13

## 2025-06-13 ENCOUNTER — APPOINTMENT (OUTPATIENT)
Dept: OPHTHALMOLOGY | Facility: CLINIC | Age: 89
End: 2025-06-13
Payer: MEDICARE

## 2025-06-13 PROCEDURE — 92014 COMPRE OPH EXAM EST PT 1/>: CPT

## 2025-06-13 PROCEDURE — 92134 CPTRZ OPH DX IMG PST SGM RTA: CPT

## 2025-08-28 ENCOUNTER — APPOINTMENT (OUTPATIENT)
Dept: ENDOCRINOLOGY | Facility: CLINIC | Age: 89
End: 2025-08-28
Payer: MEDICARE

## 2025-08-28 VITALS — BODY MASS INDEX: 26.06 KG/M2 | WEIGHT: 168 LBS | HEIGHT: 67.3 IN

## 2025-08-28 VITALS
WEIGHT: 168 LBS | DIASTOLIC BLOOD PRESSURE: 80 MMHG | OXYGEN SATURATION: 95 % | HEART RATE: 57 BPM | HEIGHT: 67 IN | BODY MASS INDEX: 26.37 KG/M2 | SYSTOLIC BLOOD PRESSURE: 124 MMHG

## 2025-08-28 DIAGNOSIS — M81.0 AGE-RELATED OSTEOPOROSIS W/OUT CURRENT PATHOLOGICAL FRACTURE: ICD-10-CM

## 2025-08-28 DIAGNOSIS — E03.9 HYPOTHYROIDISM, UNSPECIFIED: ICD-10-CM

## 2025-08-28 DIAGNOSIS — C73 MALIGNANT NEOPLASM OF THYROID GLAND: ICD-10-CM

## 2025-08-28 PROCEDURE — ZZZZZ: CPT

## 2025-08-28 PROCEDURE — G2211 COMPLEX E/M VISIT ADD ON: CPT

## 2025-08-28 PROCEDURE — 99214 OFFICE O/P EST MOD 30 MIN: CPT

## 2025-08-28 PROCEDURE — 77080 DXA BONE DENSITY AXIAL: CPT | Mod: GA

## 2025-09-03 ENCOUNTER — APPOINTMENT (OUTPATIENT)
Dept: CARDIOLOGY | Facility: CLINIC | Age: 89
End: 2025-09-03
Payer: MEDICARE

## 2025-09-03 VITALS
BODY MASS INDEX: 26.37 KG/M2 | HEART RATE: 56 BPM | DIASTOLIC BLOOD PRESSURE: 73 MMHG | SYSTOLIC BLOOD PRESSURE: 131 MMHG | HEIGHT: 67 IN | WEIGHT: 168 LBS | OXYGEN SATURATION: 95 %

## 2025-09-03 DIAGNOSIS — R00.1 BRADYCARDIA, UNSPECIFIED: ICD-10-CM

## 2025-09-03 DIAGNOSIS — I10 ESSENTIAL (PRIMARY) HYPERTENSION: ICD-10-CM

## 2025-09-03 PROCEDURE — 99214 OFFICE O/P EST MOD 30 MIN: CPT

## 2025-09-03 PROCEDURE — 93306 TTE W/DOPPLER COMPLETE: CPT

## 2025-09-03 PROCEDURE — 93000 ELECTROCARDIOGRAM COMPLETE: CPT

## 2025-09-03 PROCEDURE — G2211 COMPLEX E/M VISIT ADD ON: CPT
